# Patient Record
Sex: MALE | Race: WHITE | NOT HISPANIC OR LATINO | Employment: FULL TIME | URBAN - METROPOLITAN AREA
[De-identification: names, ages, dates, MRNs, and addresses within clinical notes are randomized per-mention and may not be internally consistent; named-entity substitution may affect disease eponyms.]

---

## 2023-07-13 ASSESSMENT — PROMIS GLOBAL HEALTH SCALE
CARRYOUT_PHYSICAL_ACTIVITIES: COMPLETELY
RATE_MENTAL_HEALTH: VERY GOOD
EMOTIONAL_PROBLEMS: NEVER
RATE_QUALITY_OF_LIFE: VERY GOOD
RATE_GENERAL_HEALTH: GOOD
RATE_SOCIAL_SATISFACTION: EXCELLENT
RATE_AVERAGE_PAIN: 1
CARRYOUT_SOCIAL_ACTIVITIES: VERY GOOD
RATE_PHYSICAL_HEALTH: GOOD

## 2023-07-18 ENCOUNTER — OFFICE VISIT (OUTPATIENT)
Dept: PRIMARY CARE | Facility: CLINIC | Age: 33
End: 2023-07-18
Payer: COMMERCIAL

## 2023-07-18 VITALS
HEIGHT: 74 IN | HEART RATE: 67 BPM | DIASTOLIC BLOOD PRESSURE: 82 MMHG | WEIGHT: 269 LBS | BODY MASS INDEX: 34.52 KG/M2 | OXYGEN SATURATION: 98 % | SYSTOLIC BLOOD PRESSURE: 126 MMHG | TEMPERATURE: 98.6 F

## 2023-07-18 DIAGNOSIS — H90.3 ASYMMETRIC SNHL (SENSORINEURAL HEARING LOSS): ICD-10-CM

## 2023-07-18 DIAGNOSIS — Z00.00 ENCOUNTER FOR WELLNESS EXAMINATION IN ADULT: Primary | ICD-10-CM

## 2023-07-18 DIAGNOSIS — E78.00 ELEVATED LDL CHOLESTEROL LEVEL: ICD-10-CM

## 2023-07-18 DIAGNOSIS — Z00.00 HEALTHCARE MAINTENANCE: ICD-10-CM

## 2023-07-18 PROBLEM — M41.9 MILD SCOLIOSIS: Status: ACTIVE | Noted: 2023-07-18

## 2023-07-18 PROCEDURE — 1036F TOBACCO NON-USER: CPT | Performed by: STUDENT IN AN ORGANIZED HEALTH CARE EDUCATION/TRAINING PROGRAM

## 2023-07-18 PROCEDURE — 99395 PREV VISIT EST AGE 18-39: CPT | Performed by: STUDENT IN AN ORGANIZED HEALTH CARE EDUCATION/TRAINING PROGRAM

## 2023-07-18 ASSESSMENT — ENCOUNTER SYMPTOMS
SORE THROAT: 0
RHINORRHEA: 0
DYSURIA: 0
SHORTNESS OF BREATH: 0
VOMITING: 0
ARTHRALGIAS: 0
FREQUENCY: 0
NAUSEA: 0
COLOR CHANGE: 0
DIARRHEA: 0
FATIGUE: 0
NERVOUS/ANXIOUS: 0
MYALGIAS: 0
CHILLS: 0
DIZZINESS: 0
FEVER: 0
LIGHT-HEADEDNESS: 0
NUMBNESS: 0
PALPITATIONS: 0
CONSTIPATION: 0
DYSPHORIC MOOD: 0
COUGH: 0
ABDOMINAL PAIN: 0

## 2023-07-18 NOTE — PATIENT INSTRUCTIONS
[facility].  Vitamin D 8318-4923 units daily.    Thank you for coming in to get established with your yearly wellness examination.  Lab orders are in place and you can go downstairs and get the labs done fasting.  You do not need an appointment to do so.  You can confirm the hours of the lab at the  before you leave.  Please call if you do not hear about results within a week after getting it done.    You can set up a wellness examination for next year in summer.  You can always call ahead about a month or 2 to get labs ordered beforehand so we can go over them at your appointments.    Please follow-up sooner for any other concerns or complaints.  Please call for any other questions or concerns.    Thank you

## 2023-07-18 NOTE — PROGRESS NOTES
"Subjective   Patient ID: Shar Dhaliwal is a 32 y.o. male who presents for Establish Care.    HPI     No acute concerns or complaints.    Dental: Regular dental visits twice yearly.  Vision: Had Lasik a few years ago. Yearly eye exams.    Diet: Well balanced. Improving.  Exercise: 8-10 miles biking daily. 30-40 minutes.  Caffeine: Coffee  Fluids: Plenty  Supplements: Multivitamin,     Tobacco: None. Smoked for about 1 year about 2-3 cigarettes a day.  Alcohol: 5-6 drinks a week. Will try to limit.  Recreational Drugs: Occasional THC gummy. Takes for scoliosis.    Last Colonoscopy: Grandfather with prostate cancer diagnosed in 60s-70s. No family history of colon cancer.  AAA Screening: Not indicated until 63.    Influenza: Recommended annually.  Tetanus: Thinks within the last 10 years.  COVID: First 2 doses and 1 booster. Most recent booster winter 2022.    Review of Systems   Constitutional:  Negative for chills, fatigue and fever.   HENT:  Negative for congestion, rhinorrhea and sore throat.    Eyes:  Negative for visual disturbance.   Respiratory:  Negative for cough and shortness of breath.    Cardiovascular:  Negative for chest pain and palpitations.   Gastrointestinal:  Negative for abdominal pain, constipation, diarrhea, nausea and vomiting.   Genitourinary:  Negative for dysuria and frequency.   Musculoskeletal:  Negative for arthralgias and myalgias.   Skin:  Negative for color change and rash.   Neurological:  Negative for dizziness, light-headedness and numbness.   Psychiatric/Behavioral:  Negative for dysphoric mood. The patient is not nervous/anxious.        Objective   /82   Pulse 67   Temp 37 °C (98.6 °F)   Ht 1.867 m (6' 1.5\")   Wt 122 kg (269 lb)   SpO2 98%   BMI 35.01 kg/m²   BSA Body surface area is 2.52 meters squared.      Physical Exam  Vitals and nursing note reviewed.   Constitutional:       General: He is not in acute distress.     Appearance: Normal appearance. He is normal " weight. He is not ill-appearing or toxic-appearing.   HENT:      Head: Normocephalic and atraumatic.      Right Ear: Tympanic membrane, ear canal and external ear normal.      Left Ear: Tympanic membrane, ear canal and external ear normal.      Nose: Nose normal.      Mouth/Throat:      Mouth: Mucous membranes are moist.      Pharynx: Oropharynx is clear.   Eyes:      Extraocular Movements: Extraocular movements intact.      Conjunctiva/sclera: Conjunctivae normal.      Pupils: Pupils are equal, round, and reactive to light.   Cardiovascular:      Rate and Rhythm: Normal rate and regular rhythm.      Pulses: Normal pulses.      Heart sounds: Normal heart sounds.   Pulmonary:      Effort: Pulmonary effort is normal.      Breath sounds: Normal breath sounds.   Abdominal:      General: Abdomen is flat. Bowel sounds are normal.      Palpations: Abdomen is soft.   Musculoskeletal:         General: Normal range of motion.      Cervical back: Normal range of motion and neck supple.   Skin:     General: Skin is warm and dry.   Neurological:      General: No focal deficit present.      Mental Status: He is alert and oriented to person, place, and time. Mental status is at baseline.      Cranial Nerves: No cranial nerve deficit.      Sensory: No sensory deficit.      Motor: No weakness.   Psychiatric:         Mood and Affect: Mood normal.         Behavior: Behavior normal.         Thought Content: Thought content normal.         Judgment: Judgment normal.       No visits with results within 1 Year(s) from this visit.   Latest known visit with results is:   [facility] Encounter on 07/19/2021   Component Date Value Ref Range Status    Hemoglobin A1C 07/19/2021 4.9  % Final    Comment:      Diagnosis of Diabetes-Adults   Non-Diabetic: < or = 5.6%   Increased risk for developing diabetes: 5.7-6.4%   Diagnostic of diabetes: > or = 6.5%  .       Monitoring of Diabetes                Age (y)     Therapeutic Goal (%)   Adults:           >18           <7.0   Pediatrics:    13-18           <7.5                   7-12           <8.0                   0- 6            7.5-8.5   American Diabetes Association. Diabetes Care 33(S1), Jan 2010.      Estimated Average Glucose 07/19/2021 94  MG/DL Final    TSH 07/19/2021 1.01  0.44 - 3.98 mIU/L Final    Comment:  TSH testing is performed using different testing    methodology at [facility] than at other    [facility] hospitals. Direct result comparisons should    only be made within the same method.      WBC 07/19/2021 6.9  4.4 - 11.3 x10E9/L Final    nRBC 07/19/2021 0.0  0.0 - 0.0 /100 WBC Final    RBC 07/19/2021 4.42 (L)  4.50 - 5.90 x10E12/L Final    Hemoglobin 07/19/2021 13.8  13.5 - 17.5 g/dL Final    Hematocrit 07/19/2021 41.6  41.0 - 52.0 % Final    MCV 07/19/2021 94  80 - 100 fL Final    MCHC 07/19/2021 33.2  32.0 - 36.0 g/dL Final    Platelets 07/19/2021 217  150 - 450 x10E9/L Final    RDW 07/19/2021 12.2  11.5 - 14.5 % Final    Neutrophils % 07/19/2021 51.7  40.0 - 80.0 % Final    Immature Granulocytes %, Automated 07/19/2021 0.3  0.0 - 0.9 % Final    Comment:  Immature Granulocyte Count (IG) includes promyelocytes,    myelocytes and metamyelocytes but does not include bands.   Percent differential counts (%) should be interpreted in the   context of the absolute cell counts (cells/L).      Lymphocytes % 07/19/2021 32.8  13.0 - 44.0 % Final    Monocytes % 07/19/2021 7.4  2.0 - 10.0 % Final    Eosinophils % 07/19/2021 6.8  0.0 - 6.0 % Final    Basophils % 07/19/2021 1.0  0.0 - 2.0 % Final    Neutrophils Absolute 07/19/2021 3.59  1.20 - 7.70 x10E9/L Final    Lymphocytes Absolute 07/19/2021 2.27  1.20 - 4.80 x10E9/L Final    Monocytes Absolute 07/19/2021 0.51  0.10 - 1.00 x10E9/L Final    Eosinophils Absolute 07/19/2021 0.47  0.00 - 0.70 x10E9/L Final    Basophils Absolute 07/19/2021 0.07  0.00 - 0.10 x10E9/L Final    Cholesterol 07/19/2021 161  0 - 199 mg/dL Final    Comment: .      AGE       DESIRABLE   BORDERLINE HIGH   HIGH     0-19 Y     0 - 169       170 - 199     >/= 200    20-24 Y     0 - 189       190 - 224     >/= 225         >24 Y     0 - 199       200 - 239     >/= 240   **All ranges are based on fasting samples. Specific   therapeutic targets will vary based on patient-specific   cardiac risk.  .   Pediatric guidelines reference:Pediatrics 2011, 128(S5).   Adult guidelines reference: NCEP ATPIII Guidelines,     KELSIE 2001, 258:2486-97  .   Venipuncture immediately after or during the    administration of Metamizole may lead to falsely   low results. Testing should be performed immediately   prior to Metamizole dosing.      HDL 07/19/2021 38.2 (A)  mg/dL Final    Comment: .      AGE      VERY LOW   LOW     NORMAL    HIGH       0-19 Y       < 35   < 40     40-45     ----    20-24 Y       ----   < 40       >45     ----      >24 Y       ----   < 40     40-60      >60  .      Cholesterol/HDL Ratio 07/19/2021 4.2   Final    Comment: REF VALUES  DESIRABLE  < 3.4  HIGH RISK  > 5.0      LDL 07/19/2021 97  0 - 99 mg/dL Final    Comment: .                           NEAR      BORD      AGE      DESIRABLE  OPTIMAL    HIGH     HIGH     VERY HIGH     0-19 Y     0 - 109     ---    110-129   >/= 130     ----    20-24 Y     0 - 119     ---    120-159   >/= 160     ----      >24 Y     0 -  99   100-129  130-159   160-189     >/=190  .      VLDL 07/19/2021 26  0 - 40 mg/dL Final    Triglycerides 07/19/2021 131  0 - 149 mg/dL Final    Comment: .      AGE      DESIRABLE   BORDERLINE HIGH   HIGH     VERY HIGH   0 D-90 D    19 - 174         ----         ----        ----  91 D- 9 Y     0 -  74        75 -  99     >/= 100      ----    10-19 Y     0 -  89        90 - 129     >/= 130      ----    20-24 Y     0 - 114       115 - 149     >/= 150      ----         >24 Y     0 - 149       150 - 199    200- 499    >/= 500  .   Venipuncture immediately after or during the    administration of Metamizole may lead to falsely    low results. Testing should be performed immediately   prior to Metamizole dosing.      Glucose 07/19/2021 100 (H)  74 - 99 mg/dL Final    Sodium 07/19/2021 140  136 - 145 mmol/L Final    Potassium 07/19/2021 4.5  3.5 - 5.3 mmol/L Final    Chloride 07/19/2021 106  98 - 107 mmol/L Final    Bicarbonate 07/19/2021 27  21 - 32 mmol/L Final    Anion Gap 07/19/2021 12  10 - 20 mmol/L Final    Urea Nitrogen 07/19/2021 13  6 - 23 mg/dL Final    Creatinine 07/19/2021 1.02  0.50 - 1.30 mg/dL Final    GLOMERULAR FILTRATION RATE-NON AFR* 07/19/2021 >60  >60 mL/min/1.73m2 Final    GLOMERULAR FILTRATION RATE-* 07/19/2021 >60  >60 mL/min/1.73m2 Final    Comment:  CALCULATIONS OF ESTIMATED GFR ARE PERFORMED   USING THE MDRD STUDY EQUATION FOR THE   IDMS-TRACEABLE CREATININE METHODS.   CLIN CHEM 2007;53:766-72      Calcium 07/19/2021 8.9  8.6 - 10.6 mg/dL Final    Albumin 07/19/2021 4.0  3.4 - 5.0 g/dL Final    Alkaline Phosphatase 07/19/2021 42  33 - 120 U/L Final    Total Protein 07/19/2021 5.9 (L)  6.4 - 8.2 g/dL Final    AST 07/19/2021 15  9 - 39 U/L Final    Total Bilirubin 07/19/2021 0.3  0.0 - 1.2 mg/dL Final    ALT (SGPT) 07/19/2021 14  10 - 52 U/L Final    Comment:  Patients treated with Sulfasalazine may generate    falsely decreased results for ALT.       No current outpatient medications on file prior to visit.     No current facility-administered medications on file prior to visit.     No images are attached to the encounter.        Assessment/Plan   Problem List Items Addressed This Visit       Elevated LDL cholesterol level     Lipid panel ordered.         Relevant Orders    CBC and Auto Differential    Comprehensive Metabolic Panel    Lipid Panel    Vitamin D, Total    Asymmetric SNHL (sensorineural hearing loss)     Following with ENT. Yearly hearing screenings.         Relevant Orders    CBC and Auto Differential    Comprehensive Metabolic Panel     Other Visit Diagnoses       Encounter for wellness  examination in adult    -  Primary    Healthcare maintenance        Relevant Orders    CBC and Auto Differential    Comprehensive Metabolic Panel    Lipid Panel    TSH with reflex to Free T4 if abnormal    Vitamin D, Total    Urinalysis with Reflex Microscopic          Patient presenting for annual wellness examination.  Overall doing healthy without any acute concerns or complaints.  Discussed elements of health including regular dental and vision exams, regular diet and exercise, supplementation, substance use, screening tests and immunizations.  Patient is due for lab work.  Lab work orders placed.  We will contact patient with results.  Patient will continue with regular wellness care at least yearly.  He will come in sooner for acute concerns or complaints.

## 2023-07-19 ENCOUNTER — LAB (OUTPATIENT)
Dept: LAB | Facility: LAB | Age: 33
End: 2023-07-19
Payer: COMMERCIAL

## 2023-07-19 DIAGNOSIS — H90.3 ASYMMETRIC SNHL (SENSORINEURAL HEARING LOSS): ICD-10-CM

## 2023-07-19 DIAGNOSIS — E78.00 ELEVATED LDL CHOLESTEROL LEVEL: ICD-10-CM

## 2023-07-19 DIAGNOSIS — Z00.00 HEALTHCARE MAINTENANCE: ICD-10-CM

## 2023-07-19 LAB
ALANINE AMINOTRANSFERASE (SGPT) (U/L) IN SER/PLAS: 16 U/L (ref 10–52)
ALBUMIN (G/DL) IN SER/PLAS: 4.3 G/DL (ref 3.4–5)
ALKALINE PHOSPHATASE (U/L) IN SER/PLAS: 46 U/L (ref 33–120)
ANION GAP IN SER/PLAS: 12 MMOL/L (ref 10–20)
APPEARANCE, URINE: CLEAR
ASPARTATE AMINOTRANSFERASE (SGOT) (U/L) IN SER/PLAS: 18 U/L (ref 9–39)
BASOPHILS (10*3/UL) IN BLOOD BY AUTOMATED COUNT: 0.08 X10E9/L (ref 0–0.1)
BASOPHILS/100 LEUKOCYTES IN BLOOD BY AUTOMATED COUNT: 1.3 % (ref 0–2)
BILIRUBIN TOTAL (MG/DL) IN SER/PLAS: 0.5 MG/DL (ref 0–1.2)
BILIRUBIN, URINE: NEGATIVE
BLOOD, URINE: NEGATIVE
CALCIDIOL (25 OH VITAMIN D3) (NG/ML) IN SER/PLAS: 31 NG/ML
CALCIUM (MG/DL) IN SER/PLAS: 9.2 MG/DL (ref 8.6–10.6)
CARBON DIOXIDE, TOTAL (MMOL/L) IN SER/PLAS: 28 MMOL/L (ref 21–32)
CHLORIDE (MMOL/L) IN SER/PLAS: 107 MMOL/L (ref 98–107)
CHOLESTEROL (MG/DL) IN SER/PLAS: 187 MG/DL (ref 0–199)
CHOLESTEROL IN HDL (MG/DL) IN SER/PLAS: 44.7 MG/DL
CHOLESTEROL/HDL RATIO: 4.2
COLOR, URINE: YELLOW
CREATININE (MG/DL) IN SER/PLAS: 0.95 MG/DL (ref 0.5–1.3)
EOSINOPHILS (10*3/UL) IN BLOOD BY AUTOMATED COUNT: 0.63 X10E9/L (ref 0–0.7)
EOSINOPHILS/100 LEUKOCYTES IN BLOOD BY AUTOMATED COUNT: 10 % (ref 0–6)
ERYTHROCYTE DISTRIBUTION WIDTH (RATIO) BY AUTOMATED COUNT: 11.9 % (ref 11.5–14.5)
ERYTHROCYTE MEAN CORPUSCULAR HEMOGLOBIN CONCENTRATION (G/DL) BY AUTOMATED: 33.3 G/DL (ref 32–36)
ERYTHROCYTE MEAN CORPUSCULAR VOLUME (FL) BY AUTOMATED COUNT: 91 FL (ref 80–100)
ERYTHROCYTES (10*6/UL) IN BLOOD BY AUTOMATED COUNT: 5.01 X10E12/L (ref 4.5–5.9)
GFR MALE: >90 ML/MIN/1.73M2
GLUCOSE (MG/DL) IN SER/PLAS: 100 MG/DL (ref 74–99)
GLUCOSE, URINE: NEGATIVE MG/DL
HEMATOCRIT (%) IN BLOOD BY AUTOMATED COUNT: 45.7 % (ref 41–52)
HEMOGLOBIN (G/DL) IN BLOOD: 15.2 G/DL (ref 13.5–17.5)
IMMATURE GRANULOCYTES/100 LEUKOCYTES IN BLOOD BY AUTOMATED COUNT: 0.2 % (ref 0–0.9)
KETONES, URINE: NEGATIVE MG/DL
LDL: 125 MG/DL (ref 0–99)
LEUKOCYTE ESTERASE, URINE: NEGATIVE
LEUKOCYTES (10*3/UL) IN BLOOD BY AUTOMATED COUNT: 6.3 X10E9/L (ref 4.4–11.3)
LYMPHOCYTES (10*3/UL) IN BLOOD BY AUTOMATED COUNT: 1.94 X10E9/L (ref 1.2–4.8)
LYMPHOCYTES/100 LEUKOCYTES IN BLOOD BY AUTOMATED COUNT: 30.7 % (ref 13–44)
MONOCYTES (10*3/UL) IN BLOOD BY AUTOMATED COUNT: 0.42 X10E9/L (ref 0.1–1)
MONOCYTES/100 LEUKOCYTES IN BLOOD BY AUTOMATED COUNT: 6.6 % (ref 2–10)
NEUTROPHILS (10*3/UL) IN BLOOD BY AUTOMATED COUNT: 3.24 X10E9/L (ref 1.2–7.7)
NEUTROPHILS/100 LEUKOCYTES IN BLOOD BY AUTOMATED COUNT: 51.2 % (ref 40–80)
NITRITE, URINE: NEGATIVE
NRBC (PER 100 WBCS) BY AUTOMATED COUNT: 0 /100 WBC (ref 0–0)
PH, URINE: 5 (ref 5–8)
PLATELETS (10*3/UL) IN BLOOD AUTOMATED COUNT: 259 X10E9/L (ref 150–450)
POTASSIUM (MMOL/L) IN SER/PLAS: 4.9 MMOL/L (ref 3.5–5.3)
PROTEIN TOTAL: 7 G/DL (ref 6.4–8.2)
PROTEIN, URINE: NEGATIVE MG/DL
SODIUM (MMOL/L) IN SER/PLAS: 142 MMOL/L (ref 136–145)
SPECIFIC GRAVITY, URINE: 1.01 (ref 1–1.03)
THYROTROPIN (MIU/L) IN SER/PLAS BY DETECTION LIMIT <= 0.05 MIU/L: 1.64 MIU/L (ref 0.44–3.98)
TRIGLYCERIDE (MG/DL) IN SER/PLAS: 89 MG/DL (ref 0–149)
UREA NITROGEN (MG/DL) IN SER/PLAS: 11 MG/DL (ref 6–23)
UROBILINOGEN, URINE: <2 MG/DL (ref 0–1.9)
VLDL: 18 MG/DL (ref 0–40)

## 2023-07-19 PROCEDURE — 80053 COMPREHEN METABOLIC PANEL: CPT

## 2023-07-19 PROCEDURE — 84443 ASSAY THYROID STIM HORMONE: CPT

## 2023-07-19 PROCEDURE — 81003 URINALYSIS AUTO W/O SCOPE: CPT

## 2023-07-19 PROCEDURE — 36415 COLL VENOUS BLD VENIPUNCTURE: CPT

## 2023-07-19 PROCEDURE — 85025 COMPLETE CBC W/AUTO DIFF WBC: CPT

## 2023-07-19 PROCEDURE — 80061 LIPID PANEL: CPT

## 2023-07-19 PROCEDURE — 82306 VITAMIN D 25 HYDROXY: CPT

## 2024-01-26 PROBLEM — H93.12 TINNITUS OF LEFT EAR: Status: ACTIVE | Noted: 2024-01-26

## 2024-01-26 PROBLEM — R73.09 ELEVATED RANDOM BLOOD GLUCOSE LEVEL: Status: ACTIVE | Noted: 2024-01-26

## 2024-01-26 PROBLEM — R73.9 ELEVATED RANDOM BLOOD GLUCOSE LEVEL: Status: ACTIVE | Noted: 2024-01-26

## 2024-02-23 ENCOUNTER — CLINICAL SUPPORT (OUTPATIENT)
Dept: AUDIOLOGY | Facility: CLINIC | Age: 34
End: 2024-02-23
Payer: COMMERCIAL

## 2024-02-23 ENCOUNTER — OFFICE VISIT (OUTPATIENT)
Dept: OTOLARYNGOLOGY | Facility: CLINIC | Age: 34
End: 2024-02-23
Payer: COMMERCIAL

## 2024-02-23 VITALS — BODY MASS INDEX: 32.73 KG/M2 | WEIGHT: 255 LBS | HEIGHT: 74 IN

## 2024-02-23 DIAGNOSIS — H90.3 ASYMMETRIC SNHL (SENSORINEURAL HEARING LOSS): Primary | ICD-10-CM

## 2024-02-23 DIAGNOSIS — H93.12 TINNITUS OF LEFT EAR: ICD-10-CM

## 2024-02-23 DIAGNOSIS — J31.0 CHRONIC RHINITIS: ICD-10-CM

## 2024-02-23 PROBLEM — M22.42 CHONDROMALACIA OF LEFT PATELLA: Status: ACTIVE | Noted: 2021-10-07

## 2024-02-23 PROBLEM — R09.81 NASAL CONGESTION: Status: ACTIVE | Noted: 2024-02-23

## 2024-02-23 PROBLEM — R20.2 PARESTHESIA: Status: ACTIVE | Noted: 2024-02-23

## 2024-02-23 PROBLEM — J34.3 HYPERTROPHY OF NASAL TURBINATES: Status: ACTIVE | Noted: 2024-02-23

## 2024-02-23 PROBLEM — M79.606 PAIN OF LOWER EXTREMITY: Status: ACTIVE | Noted: 2024-02-23

## 2024-02-23 PROBLEM — H93.8X9 PRESSURE SENSATION IN EAR: Status: ACTIVE | Noted: 2024-02-23

## 2024-02-23 PROBLEM — Z86.16 HISTORY OF SEVERE ACUTE RESPIRATORY SYNDROME CORONAVIRUS 2 (SARS-COV-2) DISEASE: Status: ACTIVE | Noted: 2024-02-23

## 2024-02-23 PROBLEM — J34.89 NASAL DISCHARGE: Status: ACTIVE | Noted: 2024-02-23

## 2024-02-23 PROBLEM — H69.90 DYSFUNCTION OF EUSTACHIAN TUBE: Status: ACTIVE | Noted: 2024-02-23

## 2024-02-23 PROBLEM — E66.9 OBESITY WITH BODY MASS INDEX 30 OR GREATER: Status: ACTIVE | Noted: 2024-02-23

## 2024-02-23 PROCEDURE — 92567 TYMPANOMETRY: CPT | Performed by: AUDIOLOGIST

## 2024-02-23 PROCEDURE — 1036F TOBACCO NON-USER: CPT | Performed by: STUDENT IN AN ORGANIZED HEALTH CARE EDUCATION/TRAINING PROGRAM

## 2024-02-23 PROCEDURE — 92557 COMPREHENSIVE HEARING TEST: CPT | Performed by: AUDIOLOGIST

## 2024-02-23 PROCEDURE — 99214 OFFICE O/P EST MOD 30 MIN: CPT | Performed by: STUDENT IN AN ORGANIZED HEALTH CARE EDUCATION/TRAINING PROGRAM

## 2024-02-23 NOTE — PROGRESS NOTES
Assessment  Bilateral eustachian tube dysfunction  Asymmetric sensorineural hearing loss  Chronic rhinitis  Nasal congestion/obstruction  Nasal drainage  Bilateral inferior turbinate hypertrophy     Plan  -Chronic rhinitis, nasal obstruction   The patient and I discussed their current nasal / sinus symptoms as well as several therapeutic options. Reports resolution of his sinonasal symptomatology, has stopped using medicated nasal spray. He will like to continue management with daily saline irrigations.      - Asymmetric sensorineural hearing loss  MRI IAC 1/31/23: No CPA/IAC lesions or masses.   Questionable left superior semicircular canal dehiscence.  The patient endorses left tinnitus, no other otologic complaint.  Denies noticeable hearing changes, aural fullness, autophony or vertigo.   Audiogram performed today showing asymmetric sensorineural hearing loss with slight progression at  8000 Hz otherwise minimal changes.  We will continue to monitor his hearing with yearly audiograms.  RTC 1y or sooner as needed.               History of Present Illness  2/23/24  The patient presents for follow-up, states he has been doing well, reports his left tinnitus is less intense/noticeable.  Denies vertigo, autophony, otalgia, otorrhea/ear infections.   No sinonasal complaints.  2/21/23  The patient presents for follow-up, MRI IAC reviewed with the patient, no evidence of CPA/IAC lesions/masses noted.  Questionable left superior semicircular canal dehiscence, patient reports he is asymptomatic.  7/26/22  The patient presents for follow-up, reports he sinonasal symptomatology and aural pressure have resolved. Status post 1 month course of Flonase, azelastine and saline irrigations, has recently stopped his medicated nasal spray and has been doing well. Currently doing daily nasal saline irrigations.  Recall   31-year-old male presented for initial evaluation multiple ENT complaints.  The patient reports developing  bilateral aural pressure, crackling/popping for the past 2 months, in addition the patient reports bilateral high-frequency tinnitus more noticeable during quiet environments.  The patient reports left hearing loss since childhood.   History is otologic symptoms are worsened by seasonal allergies.      Also endorses multiple sinonasal issues  Main sinonasal symptoms: Nasal congestion/obstruction, anterior posterior nasal drainage  Duration: Years  Laterality: Bilateral  Endorses occasional watery/itchy eyes  Patient denies headaches, facial pain, facial pressure, ansomia, dental pain, immunotherapy.   No odynophagia/dysphagia/SOB/dyspnea/hoarseness/fevers/chills/weight loss/night sweats.     Denies ear pain, vertigo, hearing changes, itching, discharge from ear or autophony.   Denies history of prior ear surgery, noise exposure, exposure to ototoxic drugs or agents.     Current treatment:  Nasal Steroid: No  Sinus Rinse: No  Antihistamine: No  Prednisone: No  Other: None     Recent CT: None   Previous nasal/sinus surgery: None       Past Medical History:   Diagnosis Date    Other specified health status     Known health problems: none    Personal history of COVID-19     History of COVID-19    Personal history of other drug therapy     COVID-19 vaccine series completed       Past Surgical History:   Procedure Laterality Date    OTHER SURGICAL HISTORY  06/11/2020    Tonsillectomy with adenoidectomy    REFRACTIVE SURGERY Bilateral          No current outpatient medications on file prior to visit.     No current facility-administered medications on file prior to visit.        Allergies   Allergen Reactions    Ceclor [Cefaclor] Hives and Rash        Review of Systems  A detailed 12 point ROS was performed and is negative except as noted in the intake form, HPI and/or Past Medical History        Physical Exam   CONSTITUTIONAL: Well developed, well nourished.  VOICE: Normal voice quality  RESPIRATION: Breathing  comfortably, no stridor.  CV: No clubbing/cyanosis/edema in hands.  EYES: EOM Intact, sclera normal.  NEURO: Alert and oriented times 3, Cranial nerves V,VII intact and symmetric bilaterally.  HEAD AND FACE: Symmetric facial features, no masses or lesions, sinuses nontender to palpation.  SALIVARY GLANDS: Parotid and submandibular glands normal bilaterally.  EARS: Normal external ears, external auditory canals, and TMs to otoscopy  + NOSE: External nose midline, anterior rhinoscopy is normal with limited visualization to the anterior aspect of the interior turbinates. No lesions noted.  Bilateral inferior turbinate hypertrophy  Left septal deviation  ORAL CAVITY/OROPHARYNX/LIPS: Normal mucous membranes, normal floor of mouth/tongue/OP, no masses or lesions are noted.  PHARYNGEAL WALLS AND NASOPHARYNX: No masses noted. Mucosa appears clean and moist  NECK/LYMPH: No LAD, no thyroid masses. Trachea palpably midline  SKIN: Neck skin is without injury  PSYCH: Alert and oriented with appropriate mood and affect     Results:   Audiogram performed today reviewed  Right: Mild sensorineural hearing loss at 6000 Hz but otherwise normal hearing bilaterally. Speech discrimination score 100%.  Type a tympanograms.  Left: Normal hearing up to 2000 Hz sloping to moderate sensorineural hearing loss.  Speech discrimination score 100%.  Type a tympanogram.

## 2024-02-23 NOTE — PROGRESS NOTES
[facility]  AUDIOMETRIC EVALUATION      Name:  Shar Dhaliwal   :  1990  Age:  33 y.o.  Date of Evaluation:  24    HISTORY    Shar Dhaliwal is seen today at the request of Fred Zayas MD. Patient stated that he feels his hearing has remained stable since his last evaluation in 2022. He further stated that although he still has tinnitus in both ears it is not as pronounced now.     EVALUATION  See Audiogram    RESULTS    Otoscopic Evaluation:  Right Ear:  clear canal  Left Ear:  clear canal    Tympanometry:  Right Ear: Type A-consistent with normal eardrum mobility and middle ear pressure  Left Ear:  Type A-consistent with normal eardrum mobility and middle ear pressure      Pure Tone Audiometry:    Right Ear:  normal hearing through 4000 Hz. Sloping to a mild sensorineuralhearing loss  Left Ear:  normal hearing through 2000 Hz. sloping to a moderate sensorineuralhearing loss   Asymmetry was noted from 6723-6695 Hz.    Speech Audiometry:     Right Ear:  excellent in quiet when words were presented at 45 dBHL  Left Ear:  excellent in quiet when words were presented at 50 dBHL  Speech reception thresholds were in good agreement with pure tone testing.    DISCUSSION  Results were relayed to Fred Zayas MD    APPOINTMENT TIME  8:30 - 9:00 AM.      Arminda Aparicio MA, CCC/A  Licensed Audiologist

## 2025-02-28 ENCOUNTER — APPOINTMENT (OUTPATIENT)
Dept: OTOLARYNGOLOGY | Facility: CLINIC | Age: 35
End: 2025-02-28
Payer: COMMERCIAL

## 2025-02-28 ENCOUNTER — APPOINTMENT (OUTPATIENT)
Dept: AUDIOLOGY | Facility: CLINIC | Age: 35
End: 2025-02-28
Payer: COMMERCIAL

## 2025-04-02 ENCOUNTER — APPOINTMENT (OUTPATIENT)
Dept: OTOLARYNGOLOGY | Facility: CLINIC | Age: 35
End: 2025-04-02
Payer: COMMERCIAL

## 2025-04-02 ENCOUNTER — APPOINTMENT (OUTPATIENT)
Dept: AUDIOLOGY | Facility: CLINIC | Age: 35
End: 2025-04-02
Payer: COMMERCIAL

## 2025-04-02 VITALS — HEIGHT: 74 IN | BODY MASS INDEX: 32.73 KG/M2 | WEIGHT: 255 LBS

## 2025-04-02 DIAGNOSIS — H90.42 SENSORINEURAL HEARING LOSS (SNHL) OF LEFT EAR WITH UNRESTRICTED HEARING OF RIGHT EAR: Primary | ICD-10-CM

## 2025-04-02 DIAGNOSIS — H90.3 ASYMMETRIC SNHL (SENSORINEURAL HEARING LOSS): Primary | ICD-10-CM

## 2025-04-02 DIAGNOSIS — H93.12 TINNITUS OF LEFT EAR: ICD-10-CM

## 2025-04-02 DIAGNOSIS — H90.3 ASYMMETRIC SNHL (SENSORINEURAL HEARING LOSS): ICD-10-CM

## 2025-04-02 PROCEDURE — 92557 COMPREHENSIVE HEARING TEST: CPT | Performed by: AUDIOLOGIST

## 2025-04-02 PROCEDURE — 1036F TOBACCO NON-USER: CPT | Performed by: STUDENT IN AN ORGANIZED HEALTH CARE EDUCATION/TRAINING PROGRAM

## 2025-04-02 PROCEDURE — 92567 TYMPANOMETRY: CPT | Performed by: AUDIOLOGIST

## 2025-04-02 PROCEDURE — 99214 OFFICE O/P EST MOD 30 MIN: CPT | Performed by: STUDENT IN AN ORGANIZED HEALTH CARE EDUCATION/TRAINING PROGRAM

## 2025-04-02 PROCEDURE — 3008F BODY MASS INDEX DOCD: CPT | Performed by: STUDENT IN AN ORGANIZED HEALTH CARE EDUCATION/TRAINING PROGRAM

## 2025-04-02 NOTE — PROGRESS NOTES
[facility]  AUDIOMETRIC EVALUATION      Name:  Shar Dhaliwal   :  1990  Age:  34 y.o.  Date of Evaluation:  25    HISTORY    Shar Dhaliwal is seen today at the request of Fred Zayas M.D.  The patient is an established patient monitoring hearing loss progression.    EVALUATION  See scanned audiogram in Media and included at the end of this report.    RESULTS    Otoscopic Evaluation:  Right Ear:  clear   Left Ear:  clear    Tympanometry:   Right Ear:  Type A, consistent with normal eardrum mobility and middle ear pressure   Left Ear:  Type A, consistent with normal eardrum mobility and middle ear pressure    Acoustic reflexes were not completed    Pure Tone Audiometry:    Right Ear:  hearing sensitivity was within normal limits  Left Ear:  normal to mild sensorineural hearing loss       Speech Audiometry:    Right Ear:  excellent in quiet at a normal presentation level  Left Ear:  excellent in quiet at a normal presentation level  Speech reception thresholds were in good agreement with pure tone testing.    DISCUSSION  Results were relayed to Fred Zayas M.D.    APPOINTMENT TIME  11:00am-11:30am     Giana Stein  Doctor of Audiology  Senior Audiologist

## 2025-04-02 NOTE — PROGRESS NOTES
Assessment  Asymmetric sensorineural hearing loss    Plan  - Asymmetric sensorineural hearing loss  MRI IAC 1/31/23: No CPA/IAC lesions or masses.   Questionable left superior semicircular canal dehiscence.  The patient endorses occasional left tinnitus, no other otologic complaint.  Denies vestibular symptomatology.   Audiogram performed today showing asymmetric sensorineural hearing loss with improvement of left hearing thresholds were compared to audiogram performed 1 year ago.    We will monitor his hearing with yearly audiograms.  RTC 1y               History of Present Illness  4/2/25  Reports improvement of his left hearing.  Denies vertigo, aural fullness.  No sinonasal complaints.  2/21/23  The patient presents for follow-up, MRI IAC reviewed with the patient, no evidence of CPA/IAC lesions/masses noted.  Questionable left superior semicircular canal dehiscence, patient reports he is asymptomatic.  7/26/22  The patient presents for follow-up, reports he sinonasal symptomatology and aural pressure have resolved. Status post 1 month course of Flonase, azelastine and saline irrigations, has recently stopped his medicated nasal spray and has been doing well. Currently doing daily nasal saline irrigations.  Recall   31-year-old male presented for initial evaluation multiple ENT complaints.  The patient reports developing bilateral aural pressure, crackling/popping for the past 2 months, in addition the patient reports bilateral high-frequency tinnitus more noticeable during quiet environments.  The patient reports left hearing loss since childhood.   History is otologic symptoms are worsened by seasonal allergies.      Also endorses multiple sinonasal issues  Main sinonasal symptoms: Nasal congestion/obstruction, anterior posterior nasal drainage  Duration: Years  Laterality: Bilateral  Endorses occasional watery/itchy eyes  Patient denies headaches, facial pain, facial pressure, ansomia, dental pain,  immunotherapy.   No odynophagia/dysphagia/SOB/dyspnea/hoarseness/fevers/chills/weight loss/night sweats.     Denies ear pain, vertigo, hearing changes, itching, discharge from ear or autophony.   Denies history of prior ear surgery, noise exposure, exposure to ototoxic drugs or agents.     Current treatment:  Nasal Steroid: No  Sinus Rinse: No  Antihistamine: No  Prednisone: No  Other: None     Recent CT: None   Previous nasal/sinus surgery: None       Past Medical History:   Diagnosis Date    Other specified health status     Known health problems: none    Personal history of COVID-19     History of COVID-19    Personal history of other drug therapy     COVID-19 vaccine series completed       Past Surgical History:   Procedure Laterality Date    OTHER SURGICAL HISTORY  06/11/2020    Tonsillectomy with adenoidectomy    REFRACTIVE SURGERY Bilateral          No current outpatient medications on file prior to visit.     No current facility-administered medications on file prior to visit.        Allergies   Allergen Reactions    Ceclor [Cefaclor] Hives and Rash        Review of Systems  A detailed 12 point ROS was performed and is negative except as noted in the intake form, HPI and/or Past Medical History        Physical Exam   CONSTITUTIONAL: Well developed, well nourished.  VOICE: Normal voice quality  RESPIRATION: Breathing comfortably, no stridor.  CV: No clubbing/cyanosis/edema in hands.  EYES: EOM Intact, sclera normal.  NEURO: Alert and oriented times 3, Cranial nerves V,VII intact and symmetric bilaterally.  HEAD AND FACE: Symmetric facial features, no masses or lesions, sinuses nontender to palpation.  SALIVARY GLANDS: Parotid and submandibular glands normal bilaterally.  EARS: Normal external ears, external auditory canals, and TMs to otoscopy  + NOSE: External nose midline, anterior rhinoscopy is normal with limited visualization to the anterior aspect of the interior turbinates. No lesions noted.  Bilateral  inferior turbinate hypertrophy  Left septal deviation  ORAL CAVITY/OROPHARYNX/LIPS: Normal mucous membranes, normal floor of mouth/tongue/OP, no masses or lesions are noted.  PHARYNGEAL WALLS AND NASOPHARYNX: No masses noted. Mucosa appears clean and moist  NECK/LYMPH: No LAD, no thyroid masses. Trachea palpably midline  SKIN: Neck skin is without injury  PSYCH: Alert and oriented with appropriate mood and affect     Results:   Audiogram 4/2/25 personally reviewed  Right: Normal hearing.  Speech discrimination score 100%.  Type A tympanogram.  Left: Normal hearing up to 2000 Hz sloping to mild/ moderate sensorineural hearing loss.  Speech discrimination score 100%.  Type A tympanogram.

## 2026-04-06 ENCOUNTER — APPOINTMENT (OUTPATIENT)
Dept: OTOLARYNGOLOGY | Facility: CLINIC | Age: 36
End: 2026-04-06
Payer: COMMERCIAL

## 2026-04-06 ENCOUNTER — APPOINTMENT (OUTPATIENT)
Dept: AUDIOLOGY | Facility: CLINIC | Age: 36
End: 2026-04-06
Payer: COMMERCIAL

## 2026-05-26 ENCOUNTER — APPOINTMENT (OUTPATIENT)
Dept: AUDIOLOGY | Facility: CLINIC | Age: 36
End: 2026-05-26
Payer: COMMERCIAL

## 2026-05-26 ENCOUNTER — APPOINTMENT (OUTPATIENT)
Dept: OTOLARYNGOLOGY | Facility: CLINIC | Age: 36
End: 2026-05-26
Payer: COMMERCIAL